# Patient Record
Sex: FEMALE | Race: WHITE | NOT HISPANIC OR LATINO | Employment: UNEMPLOYED | URBAN - NONMETROPOLITAN AREA
[De-identification: names, ages, dates, MRNs, and addresses within clinical notes are randomized per-mention and may not be internally consistent; named-entity substitution may affect disease eponyms.]

---

## 2020-10-21 ENCOUNTER — HOSPITAL ENCOUNTER (EMERGENCY)
Facility: HOSPITAL | Age: 30
Discharge: HOME | End: 2020-10-21
Attending: EMERGENCY MEDICINE | Admitting: EMERGENCY MEDICINE

## 2020-10-21 VITALS
HEIGHT: 63 IN | RESPIRATION RATE: 18 BRPM | TEMPERATURE: 98.8 F | BODY MASS INDEX: 26.22 KG/M2 | DIASTOLIC BLOOD PRESSURE: 76 MMHG | SYSTOLIC BLOOD PRESSURE: 100 MMHG | WEIGHT: 148 LBS | OXYGEN SATURATION: 97 % | HEART RATE: 66 BPM

## 2020-10-21 DIAGNOSIS — J06.9 UPPER RESPIRATORY TRACT INFECTION, UNSPECIFIED TYPE: Primary | ICD-10-CM

## 2020-10-21 LAB — SARS-COV-2 RDRP RESP QL NAA+PROBE: NOT DETECTED

## 2020-10-21 PROCEDURE — C9803 HOPD COVID-19 SPEC COLLECT: HCPCS

## 2020-10-21 PROCEDURE — 87635 SARS-COV-2 COVID-19 AMP PRB: CPT | Performed by: EMERGENCY MEDICINE

## 2020-10-21 PROCEDURE — 99283 EMERGENCY DEPT VISIT LOW MDM: CPT

## 2020-10-21 NOTE — ED PROVIDER NOTES
Subjective   22-year-old white female here today for Covid test.  Patient was arrested by [facility] and told him that she may have Covid.  Therefore she is come here for testing.  She says she lives with a cousin who was diagnosed with Covid about 1 week ago, but that she has not been around her much.  Patient complains of runny nose, cough, but denies any headache, chest pain, shortness of breath, nausea, vomiting, diarrhea, fever, chills or other complaint.          Review of Systems   All other systems reviewed and are negative.      No past medical history on file.    Allergies   Allergen Reactions   • Sulfa Antibiotics Rash       No past surgical history on file.    No family history on file.    Social History     Socioeconomic History   • Marital status: Single     Spouse name: Not on file   • Number of children: Not on file   • Years of education: Not on file   • Highest education level: Not on file           Objective   Physical Exam  Vitals signs and nursing note reviewed.   Constitutional:       Appearance: Normal appearance.   HENT:      Head: Normocephalic and atraumatic.   Eyes:      General: No scleral icterus.  Neck:      Musculoskeletal: Normal range of motion.   Cardiovascular:      Rate and Rhythm: Normal rate and regular rhythm.      Heart sounds: No murmur. No friction rub. No gallop.    Pulmonary:      Effort: Pulmonary effort is normal.      Breath sounds: Normal breath sounds. No wheezing, rhonchi or rales.   Abdominal:      General: Abdomen is flat. Bowel sounds are normal. There is no distension.      Palpations: Abdomen is soft.      Tenderness: There is no abdominal tenderness.   Musculoskeletal: Normal range of motion.   Skin:     General: Skin is warm and dry.      Comments: Multiple excoriated papules noted on face, upper back consistent with neurodermatitis/methamphetamine use.  Patient wearing long sleeves.   Neurological:      Mental Status: She is alert and oriented to person, place, and  time.   Psychiatric:         Mood and Affect: Mood normal.         Behavior: Behavior normal.         Procedures           ED Course      Results for orders placed or performed during the hospital encounter of 10/21/20   COVID-19, [facility] IN-HOUSE,NP Swab (NO TRANSPORT MEDIA) 2 HR TAT - Swab, Nasopharynx    Specimen: Nasopharynx; Swab   Result Value Ref Range    COVID19 Not Detected Not Detected - Ref. Range                                          MDM  Number of Diagnoses or Management Options  Upper respiratory tract infection, unspecified type:   Risk of Complications, Morbidity, and/or Mortality  Presenting problems: moderate  Diagnostic procedures: low  Management options: low        Final diagnoses:   Upper respiratory tract infection, unspecified type            Silvio Doshi MD  10/21/20 0146

## 2021-11-21 ENCOUNTER — HOSPITAL ENCOUNTER (EMERGENCY)
Dept: HOSPITAL 79 - ER1 | Age: 31
LOS: 1 days | Discharge: HOME | End: 2021-11-22
Payer: COMMERCIAL

## 2021-11-21 DIAGNOSIS — R07.9: ICD-10-CM

## 2021-11-21 DIAGNOSIS — M54.2: Primary | ICD-10-CM
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
## 2021-11-21 DIAGNOSIS — Z91.14: ICD-10-CM

## 2021-11-21 DIAGNOSIS — R51.9: ICD-10-CM
